# Patient Record
Sex: FEMALE | Race: WHITE
[De-identification: names, ages, dates, MRNs, and addresses within clinical notes are randomized per-mention and may not be internally consistent; named-entity substitution may affect disease eponyms.]

---

## 2019-07-06 ENCOUNTER — HOSPITAL ENCOUNTER (EMERGENCY)
Dept: HOSPITAL 77 - KA.ED | Age: 34
Discharge: HOME | End: 2019-07-06
Payer: COMMERCIAL

## 2019-07-06 VITALS — HEART RATE: 60 BPM | SYSTOLIC BLOOD PRESSURE: 148 MMHG | DIASTOLIC BLOOD PRESSURE: 82 MMHG

## 2019-07-06 DIAGNOSIS — S39.011A: Primary | ICD-10-CM

## 2019-07-06 DIAGNOSIS — R79.89: ICD-10-CM

## 2019-07-06 DIAGNOSIS — Z79.899: ICD-10-CM
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
## 2019-07-06 LAB
ANION GAP SERPL CALC-SCNC: 11 MMOL/L (ref 5–15)
CHLORIDE SERPL-SCNC: 111 MMOL/L (ref 98–115)
SODIUM SERPL-SCNC: 144 MMOL/L (ref 136–145)

## 2019-07-06 PROCEDURE — 96375 TX/PRO/DX INJ NEW DRUG ADDON: CPT

## 2019-07-06 PROCEDURE — 96374 THER/PROPH/DIAG INJ IV PUSH: CPT

## 2019-07-06 PROCEDURE — 80053 COMPREHEN METABOLIC PANEL: CPT

## 2019-07-06 PROCEDURE — 72100 X-RAY EXAM L-S SPINE 2/3 VWS: CPT

## 2019-07-06 PROCEDURE — 85025 COMPLETE CBC W/AUTO DIFF WBC: CPT

## 2019-07-06 PROCEDURE — 99284 EMERGENCY DEPT VISIT MOD MDM: CPT

## 2019-07-06 PROCEDURE — 73502 X-RAY EXAM HIP UNI 2-3 VIEWS: CPT

## 2019-07-06 PROCEDURE — 74177 CT ABD & PELVIS W/CONTRAST: CPT

## 2019-07-06 PROCEDURE — 81001 URINALYSIS AUTO W/SCOPE: CPT

## 2019-07-06 PROCEDURE — 84703 CHORIONIC GONADOTROPIN ASSAY: CPT

## 2019-07-06 PROCEDURE — 36415 COLL VENOUS BLD VENIPUNCTURE: CPT

## 2019-07-06 RX ADMIN — Medication PRN ML: at 15:00

## 2019-07-06 RX ADMIN — Medication PRN ML: at 15:10

## 2019-07-06 NOTE — CR
______________________________________________________________________________   

  

[id number] RAD/RAD Pelvis 1V W 2V Left Hip  

Exam:   

   

 RAD Pelvis 1V W 2V Left Hip  

   

 Clinical Data:   

   

 TRAUMA  

   

 COMPARISON:   

   

 NO PREVIOUS SIMILAR EXAM IS AVAILABLE  

   

 FINDINGS:   

   

 No fracture or dislocation is seen.   

   

 IMPRESSION:  

   

 NEGATIVE EXAM  

   

 Electronically signed by Naif Shine MD on 7/6/2019 3:41 PM  

   

  

Naif Shine MD                 

 07/06/19 2267    

  

Thank you for allowing us to participate in the care of your patient.

## 2019-07-06 NOTE — EDM.PDOC
ED HPI GENERAL MEDICAL PROBLEM





- General


Chief Complaint: Lower Extremity Injury/Pain


Stated Complaint: LEFT HIP INJURY


Time Seen by Provider: 07/06/19 14:45


Source of Information: Reports: Patient


History Limitations: Reports: No Limitations





- History of Present Illness


INITIAL COMMENTS - FREE TEXT/NARRATIVE: 





34 YO WF presents to ER complaining of left hip pain [place] 

[place]. Pt reports she let go of the tube and [place] 

[place] causing immediate pain and the sensation of 

swelling to the left hip and LLQ of her abdomen. Pt denies any other injuries. 

Pt denies shortness of breath, no chest pain, no head or neck pain. Pt able to 

ambulate into ER with difficulty. Pt complains of her left hip but has full 

active and passive ROM of hip without pain. Pt is tender on abdominal wall with 

pain on truck rotation or hip flexion on left side.  


Onset: Today


Duration: Hour(s): (1)


Location: Reports: Lower Extremity, Left


Quality: Reports: Ache


Severity: Severe


Improves with: Reports: Rest


Worsens with: Reports: Movement


Context: Reports: Activity


Associated Symptoms: Reports: No Other Symptoms.  Denies: Chest Pain, Headaches

, Nausea/Vomiting, Shortness of Breath


  ** Left Hip


Pain Score (Numeric/FACES): 8





- Related Data


 Allergies











Allergy/AdvReac Type Severity Reaction Status Date / Time


 


No Known Allergies Allergy   Verified 07/06/19 14:38











Home Meds: 


 Home Meds





Levothyroxine 75 mcg PO DAILY 07/06/19 [History]


Meloxicam 15 mg PO DAILY 07/06/19 [History]











Review of Systems





- Review of Systems


Review Of Systems: See Below


Constitutional: Reports: No Symptoms


Eyes: Reports: No Symptoms


Ears: Reports: No Symptoms


Nose: Reports: No Symptoms


Mouth/Throat: Reports: No Symptoms


Respiratory: Reports: No Symptoms


Cardiovascular: Reports: No Symptoms


GI/Abdominal: Reports: No Symptoms


Genitourinary: Reports: No Symptoms


Musculoskeletal: Reports: Leg Pain


Skin: Reports: No Symptoms


Neurological: Reports: No Symptoms


Psychiatric: Reports: No Symptoms





ED EXAM, GENERAL





- Physical Exam


Exam: See Below


Exam Limited By: No Limitations


General Appearance: Alert, WD/WN, Mild Distress


Eye Exam: Bilateral Eye: PERRL


Throat/Mouth: Normal Inspection, Normal Lips, Normal Teeth, Normal Gums, Normal 

Oropharynx, Normal Voice, No Airway Compromise


Head: Atraumatic, Normocephalic


Neck: Normal Inspection, Supple, Non-Tender, Full Range of Motion


Respiratory/Chest: No Respiratory Distress, Lungs Clear, Normal Breath Sounds, 

No Accessory Muscle Use, Chest Non-Tender


Cardiovascular: Normal Peripheral Pulses, Regular Rate, Rhythm, No Edema, No 

Gallop, No JVD, No Murmur, No Rub


GI/Abdominal: Normal Bowel Sounds, Soft, No Organomegaly, No Distention, No 

Abnormal Bruit, No Mass, Guarding, Rebound, Tender (LLQ tenderness)


Back Exam: Normal Inspection, Full Range of Motion, NT


Extremities: Normal Inspection, Normal Range of Motion, Non-Tender, Normal 

Capillary Refill, No Pedal Edema


Neurological: Alert, Oriented, CN II-XII Intact, Normal Cognition, Normal Gait, 

Normal Reflexes, No Motor/Sensory Deficits


Psychiatric: Normal Affect, Normal Mood, Anxious


Skin Exam: Warm, Dry, Intact, Normal Color, No Rash


Lymphatic: No Adenopathy





Course





- Vital Signs


Last Recorded V/S: 


 Last Vital Signs











Temp  36.9 C   07/06/19 14:41


 


Pulse  60   07/06/19 14:41


 


Resp  14   07/06/19 14:41


 


BP  148/82 H  07/06/19 14:41


 


Pulse Ox  97   07/06/19 14:41














- Orders/Labs/Meds


Orders: 


 Active Orders 24 hr











 Category Date Time Status


 


 Peripheral IV Care [RC] .AS DIRECTED Care  07/06/19 15:00 Active


 


 Acetaminophen/HYDROcodone [Norco 325-10 MG] Med  07/06/19 16:30 Once





 10 tab PO ONETIME ONE   


 


 Sodium Chloride 0.9% [Normal Saline] 50 ml Med  07/06/19 15:45 Active





 IV ASDIRECTED   


 


 Sodium Chloride 0.9% [Saline Flush] Med  07/06/19 15:00 Active





 10 ml FLUSH Q8HR PRN   


 


 Peripheral IV Insertion Adult [OM.PC] Routine Oth  07/06/19 15:00 Ordered








 Medication Orders





Hydrocodone Bitart/Acetaminophen (Norco 325-10 Mg)  10 tab PO ONETIME ONE


   Stop: 07/06/19 16:31


Sodium Chloride (Normal Saline)  50 mls @ 200 mls/hr IV ASDIRECTED DANIELLE


   Last Admin: 07/06/19 16:04  Dose: 200 mls/hr


Sodium Chloride (Saline Flush)  10 ml FLUSH Q8HR PRN


   PRN Reason: keep vein open


   Last Admin: 07/06/19 15:10  Dose: 10 ml


   Admin: 07/06/19 15:00  Dose: 10 ml








Labs: 


 Laboratory Tests











  07/06/19 07/06/19 07/06/19 Range/Units





  15:25 15:47 15:47 


 


WBC   9.81   (5.00-10.00)  10^3/uL


 


RBC   3.85   (3.80-5.50)  10^6/uL


 


Hgb   12.0   (12.0-16.0)  g/dL


 


Hct   35.6 L   (37.0-47.0)  %


 


MCV   92.5 H   (82.0-92.0)  fL


 


MCH   31.2 H   (27.0-31.0)  pg


 


MCHC   33.7   (32.0-36.0)  g/dL


 


RDW   12.7   (11.5-14.5)  %


 


Plt Count   204   (150-400)  10^3/uL


 


MPV   11.2 H   (7.4-10.4)  fL


 


Immature Gran % (Auto)   0.2   (0.0-5.0)  %


 


Neut % (Auto)   67.5   (50.0-70.0)  %


 


Lymph % (Auto)   17.2 L   (20.0-40.0)  %


 


Mono % (Auto)   5.9   (2.0-8.0)  %


 


Eos % (Auto)   8.8 H   (1.0-3.0)  %


 


Baso % (Auto)   0.4   (0.0-1.0)  %


 


Immature Gran # (Auto)   0.02   (0.00-0.50)  10^3/uL


 


Neut # (Auto)   6.62   (2.50-7.00)  10^3/uL


 


Lymph # (Auto)   1.69   (1.00-4.00)  10^3/uL


 


Mono # (Auto)   0.58   (0.10-0.80)  10^3/uL


 


Eos # (Auto)   0.86 H   (0.10-0.30)  10^3/uL


 


Baso # (Auto)   0.04   (0.00-0.10)  10^3/uL


 


Sodium    144  (136-145)  mmol/L


 


Potassium    3.9  (3.3-5.3)  mmol/L


 


Chloride    111  ()  mmol/L


 


Carbon Dioxide    25.9  (21.0-32.0)  mmol/L


 


Anion Gap    11.0  (5-15)  mmol/L


 


BUN    10  (6-25)  mg/dL


 


Creatinine    0.68  (0.51-1.17)  mg/dL


 


Est Cr Clr Drug Dosing    92.20  mL/min


 


Estimated GFR (MDRD)    > 60  mL/min


 


Glucose    97 (75 - 99) mg/dL


 


Calcium    8.6 L  (8.7-10.3)  mg/dL


 


Total Bilirubin    0.2  (0.2-1.0)  mg/dL


 


AST    74 H  (15-37)  U/L


 


ALT    86 H  (12-78)  U/L


 


Alkaline Phosphatase    74  ()  IU/L


 


Total Protein    6.2 L  (6.4-8.2)  g/dL


 


Albumin    3.51  (3.00-4.80)  g/dL


 


HCG, Qual    Negative  (NEGATIVE)  


 


Specimen Type  Urinvoid    


 


Urine Color  Light yellow    (YELLOW)  


 


Urine Appearance  Clear    (CLEAR)  


 


Urine pH  6.5    (5.0-9.0)  


 


Ur Specific Gravity  <= 1.005    (1.005-1.030)  


 


Urine Protein  Negative    (NEGATIVE)  mg/dL


 


Urine Glucose (UA)  Negative    (NEGATIVE)  mg/dL


 


Urine Ketones  Negative    (NEGATIVE)  mg/dL


 


Urine Occult Blood  Negative    (NEGATIVE)  


 


Urine Nitrite  Negative    (NEGATIVE)  


 


Urine Bilirubin  Negative    (NEGATIVE)  


 


Urine Urobilinogen  0.2    (0.2-1.0)  E.U./dL


 


Ur Leukocyte Esterase  Negative    (NEGATIVE)  


 


Urine RBC  0-5    (0-5)  /HPF


 


Urine WBC  0-5    (0-5)  /HPF


 


Ur Epithelial Cells  Moderate H    /LPF


 


Urine Bacteria  Not seen    (NONE TO FEW)  /HPF











Meds: 


Medications











Generic Name Dose Route Start Last Admin





  Trade Name Freq  PRN Reason Stop Dose Admin


 


Hydrocodone Bitart/Acetaminophen  10 tab  07/06/19 16:30  





  Norco 325-10 Mg  PO  07/06/19 16:31  





  ONETIME ONE   





     





     





     





     


 


Sodium Chloride  50 mls @ 200 mls/hr  07/06/19 15:45  07/06/19 16:04





  Normal Saline  IV   200 mls/hr





  ASDIRECTED DANIELLE   Administration





     





     





     





     


 


Sodium Chloride  10 ml  07/06/19 15:00  07/06/19 15:10





  Saline Flush  FLUSH   10 ml





  Q8HR PRN   Administration





  keep vein open   





     





     





     














Discontinued Medications














Generic Name Dose Route Start Last Admin





  Trade Name Felixq  PRN Reason Stop Dose Admin


 


Hydromorphone HCl  1 mg  07/06/19 15:04  07/06/19 15:08





  Dilaudid  IVPUSH  07/06/19 15:05  1 mg





  ONETIME ONE   Administration





     





     





     





     


 


Iopamidol  100 ml  07/06/19 15:35  07/06/19 16:04





  Isovue-370 (76%)  IV  07/06/19 15:36  75 ml





  ONETIME ONE   Administration





     





     





     





     


 


Ondansetron HCl  4 mg  07/06/19 15:04  07/06/19 15:05





  Zofran  IVPUSH  07/06/19 15:05  4 mg





  ONETIME ONE   Administration





     





     





     





     














- Radiology Interpretation


Free Text/Narrative:: 





L-spine- NAD; no fx


Pelvis- NAD; no fx


L hip- NAD; no fx


CT abd/pelvis- NAD





Departure





- Departure


Time of Disposition: 16:35


Disposition: Home, Self-Care 01


Condition: Good


Clinical Impression: 


Strain of abdominal wall


Qualifiers:


 Encounter type: initial encounter Qualified Code(s): S39.011A - Strain of 

muscle, fascia and tendon of abdomen, initial encounter








- Discharge Information


Instructions:  Pain Medicine Instructions, Easy-to-Read, Muscle Strain, Easy-to-

Read


Referrals: 


Claudia Jaeger, NP [Nurse Practitioner] - 


Forms:  ED Department Discharge


Additional Instructions: 


1. discharge home


2. hydrocodone 10/325 1 tablet Q6 PRN


3. continue melxican


4. follow up in clinic early next week for recheck


5. return to ER fdor worsening symptoms


6. ice to hip- area may bruise 





- My Orders


Last 24 Hours: 


My Active Orders





07/06/19 15:00


Peripheral IV Care [RC] .AS DIRECTED 


Sodium Chloride 0.9% [Saline Flush]   10 ml FLUSH Q8HR PRN 


Peripheral IV Insertion Adult [OM.PC] Routine 





07/06/19 15:45


Sodium Chloride 0.9% [Normal Saline] 50 ml IV ASDIRECTED 





07/06/19 16:30


Acetaminophen/HYDROcodone [Norco 325-10 MG]   10 tab PO ONETIME ONE 














- Assessment/Plan


Last 24 Hours: 


My Active Orders





07/06/19 15:00


Peripheral IV Care [RC] .AS DIRECTED 


Sodium Chloride 0.9% [Saline Flush]   10 ml FLUSH Q8HR PRN 


Peripheral IV Insertion Adult [OM.PC] Routine 





07/06/19 15:45


Sodium Chloride 0.9% [Normal Saline] 50 ml IV ASDIRECTED 





07/06/19 16:30


Acetaminophen/HYDROcodone [Norco 325-10 MG]   10 tab PO ONETIME ONE 











Assessment:: 





1. abdominal wall strain


2. elevated LFt's - pt aware and this has been addressed as outpatient in the 

recent past





Plan: 





1. discharge home


2. hydrocodone 10/325 1 tablet Q6 PRN


3. continue melxican


4. follow up in clinic early next week for recheck


5. return to ER fdor worsening symptoms


6. ice to hip- area may bruise

## 2019-07-06 NOTE — CR
______________________________________________________________________________   

  

[id number] RAD/RAD Lumbar Spine 2-3V  

EXAM:  

   

 AP AND LATERAL LUMBAR SPINE.  

   

 INDICATION:  

   

 Trauma  

   

 COMPARISON:  

   

 No previous similar exam is available for comparison.  

   

 FINDINGS:  

   

 No fracture or subluxation is seen.  

   

 There is preservation of height of disc spaces and vertebrae.  

   

 The pedicles are intact.  

   

 IMPRESSION:  

   

 No fracture or subluxation.  

   

   

   

 Electronically signed by Naif Shine MD on 7/6/2019 3:40 PM  

   

  

Naif Shine MD                 

 07/06/19 1109    

  

Thank you for allowing us to participate in the care of your patient.

## 2019-07-06 NOTE — CT
______________________________________________________________________________   

  

[id number] CT/CT Abdomen Pelvis W IV  

EXAM:   

   

 CT Abdomen Pelvis W IV  

   

 CLINICAL DATA:   

   

 TRAUMA  

   

 COMPARISON:   

   

 NO PREVIOUS SIMILAR EXAM IS AVAILABLE.  

   

 FINDINGS:   

   

 The liver and spleen are unremarkable.  

   

 The kidneys and adrenals show no abnormality.  

   

 The aorta and pancreas are within normal limits.  

   

 There is no bowel distention.  

   

 There is no bowel wall thickening either.  

   

 Minimal free fluid in pelvis is likely physiologic.  

   

 The gallbladder and appendix are unremarkable.  

   

 There is no adenopathy.  

   

 The pelvis shows no mass, free fluid, abscess, inflammatory change, or  

 adenopathy.  

   

 Reformatted views of the lumbar spine and sacrum show no fracture.  

   

 The bony pelvis shows no fracture.  

   

 IMPRESSION:  

   

 NO ACUTE PROCESS.  

   

 Electronically signed by Naif Shine MD on 7/6/2019 4:14 PM  

   

  

Naif Shine MD                 

 07/06/19 [id number]    

  

Thank you for allowing us to participate in the care of your patient.

## 2020-08-21 NOTE — EDM.PDOC
ED HPI GENERAL MEDICAL PROBLEM





- General


Chief Complaint: Headache


Stated Complaint: HEADACHE/CHRONIC ABDOMINAL PAIN


Time Seen by Provider: 08/21/20 10:50


Source of Information: Reports: Patient


History Limitations: Reports: No Limitations





- History of Present Illness


INITIAL COMMENTS - FREE TEXT/NARRATIVE: 





35 YO WF PRESENTS TO ER COMPLAINING OF HEADACHE WHICH BEGAN 2 WEEKS AGO. PT 

REPORTS PAIN HAS BEEN LOCATED TO THE RIGHT TEMPORAL REGION AND STATES IT HAS 

BEEN TENDER TO TOUCH. 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

OFF HER HUMARA DUE TO ELEVATED LIVER ENZYMES. PT IS SUPPOSE TO START NEW 

MEDICATION BUT IS AWAITING LIVER BIOPSY. PT REPORTS SOME MID RUQ ABDOMINAL PAIN 

WITH ASSOCIATED NAUSEA. PT DENIES FEVER/CHILLS, NO COUGH/CONGESTION, NO 

VOMITING/DIARRHEA. PT REPORTS NO VISUAL CHANGES, NO PAIN TO EYE, NO DIZZINESS OR

LIGHTHEADED NOTED.     


Duration: Day(s): (10)


Location: Reports: Head


Quality: Reports: Ache


Severity: Mild


Improves with: Reports: None


Worsens with: Reports: None


Associated Symptoms: Reports: No Other Symptoms, Headaches, Nausea/Vomiting.  

Denies: Confusion, Chest Pain, Cough, cough w sputum, Fever/Chills, Rash, 

Shortness of Breath, Syncope, Weakness


  ** Right Lower Abdomen


Pain Score (Numeric/FACES): 4





  ** HEADACHE RIGHT TEMPORAL


Pain Score (Numeric/FACES): 7





- Related Data


                                    Allergies











Allergy/AdvReac Type Severity Reaction Status Date / Time


 


No Known Allergies Allergy   Verified 08/21/20 10:38











Home Meds: 


                                    Home Meds





Levothyroxine 75 mcg PO DAILY 07/06/19 [History]


predniSONE [Prednisone] 20 mg PO DAILY #15 tablet 08/21/20 [Rx]











Past Medical History


Musculoskeletal History: Reports: RA





- Past Surgical History


Head Surgeries/Procedures: Reports: None





Social & Family History





- Tobacco Use


Smoking Status *Q: Never Smoker





- Caffeine Use


Caffeine Use: Reports: Coffee





- Recreational Drug Use


Recreational Drug Use: No





ED ROS GENERAL





- Review of Systems


Review Of Systems: See Below


Constitutional: Reports: No Symptoms


HEENT: Reports: No Symptoms.  Denies: Vision Change


Respiratory: Reports: No Symptoms


Cardiovascular: Reports: No Symptoms


Endocrine: Reports: No Symptoms


GI/Abdominal: Reports: Abdominal Pain, Nausea


: Reports: No Symptoms


Musculoskeletal: Reports: No Symptoms


Skin: Reports: No Symptoms


Neurological: Reports: Headache.  Denies: Confusion, Dizziness, Numbness, 

Paresthesia, Pre-Existing Deficit, Syncope, Trouble Speaking, Difficulty 

Walking, Weakness, Change in Speech, Gait Disturbance


Psychiatric: Reports: No Symptoms


Hematologic/Lymphatic: Reports: No Symptoms


Immunologic: Reports: No Symptoms





- Physical Exam


Exam: See Below


Exam Limited By: No Limitations


General Appearance: Alert, WD/WN, No Apparent Distress


Eye Exam: Bilateral Eye: EOMI, PERRL, Vision Changes


Ears: Normal External Exam, Normal Canal, Hearing Grossly Normal, Normal TMs


Nose: Normal Inspection, Normal Mucosa, No Blood


Throat/Mouth: Normal Inspection, Normal Lips, Normal Teeth, Normal Gums, Normal 

Oropharynx, Normal Voice, No Airway Compromise


Head Exam: Atraumatic, Normocephalic


Neck: Normal Inspection, Supple, Non-Tender, Full Range of Motion


Respiratory/Chest: No Respiratory Distress, Lungs Clear, Normal Breath Sounds, 

No Accessory Muscle Use, Chest Non-Tender


Cardiovascular: Normal Peripheral Pulses, Regular Rate, Rhythm, No Edema, No 

Gallop, No JVD, No Murmur, No Rub


GI/Abdominal: Normal Bowel Sounds, Soft, Non-Tender, No Organomegaly, No 

Distention, No Abnormal Bruit, No Mass


Neuro Exam (Abbreviated): Alert, Oriented, CN II-XII Intact, Normal Cognition, 

Normal Gait, Normal Reflexes, No Motor/Sensory Deficits


Back Exam: Normal Inspection, Full Range of Motion, NT


Extremities: Normal Inspection, Normal Range of Motion, Non-Tender, No Pedal 

Edema, Normal Capillary Refill


Psychiatric: Normal Affect, Normal Mood


Skin Exam: Warm, Dry, Intact, Normal Color, No Rash





Course





- Vital Signs


Last Recorded V/S: 


                                Last Vital Signs











Temp  36.6 C   08/21/20 11:49


 


Pulse  53 L  08/21/20 11:49


 


Resp  14   08/21/20 11:49


 


BP  118/64   08/21/20 11:49


 


Pulse Ox  98   08/21/20 11:49














- Orders/Labs/Meds


Orders: 


                               Active Orders 24 hr











 Category Date Time Status


 


 Visual Acuity [Vision Test] [RC] ASDIRECTED Care  08/21/20 11:26 Active











Labs: 


                                Laboratory Tests











  08/21/20 08/21/20 08/21/20 Range/Units





  10:45 10:45 10:45 


 


WBC  5.80    (5.00-10.00)  10^3/uL


 


RBC  4.17    (3.80-5.50)  10^6/uL


 


Hgb  12.7    (12.0-16.0)  g/dL


 


Hct  38.2    (37.0-47.0)  %


 


MCV  91.6    (82.0-92.0)  fL


 


MCH  30.5    (27.0-31.0)  pg


 


MCHC  33.2    (32.0-36.0)  g/dL


 


RDW  12.1    (11.5-14.5)  %


 


Plt Count  256    (150-400)  10^3/uL


 


MPV  11.0 H    (7.4-10.4)  fL


 


Immature Gran % (Auto)  0.0    (0.0-5.0)  %


 


Neut % (Auto)  56.0    (50.0-70.0)  %


 


Lymph % (Auto)  29.5    (20.0-40.0)  %


 


Mono % (Auto)  5.9    (2.0-8.0)  %


 


Eos % (Auto)  7.9 H    (1.0-3.0)  %


 


Baso % (Auto)  0.7    (0.0-1.0)  %


 


Neut # (Auto)  3.25    (2.50-7.00)  10^3/uL


 


Lymph # (Auto)  1.71    (1.00-4.00)  10^3/uL


 


Mono # (Auto)  0.34    (0.10-0.80)  10^3/uL


 


Eos # (Auto)  0.46 H    (0.10-0.30)  10^3/uL


 


Baso # (Auto)  0.04    (0.00-0.10)  10^3/uL


 


Immature Gran # (Auto)  0.00    (0.00-0.50)  10^3/uL


 


ESR     (0-20)  mm/hr


 


Sodium   142   (136-145)  mmol/L


 


Potassium   4.7   (3.3-5.3)  mmol/L


 


Chloride   104   ()  mmol/L


 


Carbon Dioxide   28.2   (21.0-32.0)  mmol/L


 


Anion Gap   14.5   (5-15)  mmol/L


 


BUN   11   (6-25)  mg/dL


 


Creatinine   0.71   (0.51-1.17)  mg/dL


 


Est Cr Clr Drug Dosing   87.47   mL/min


 


Estimated GFR (MDRD)   > 60   mL/min


 


Glucose   82  (75 - 99) mg/dL


 


Calcium   8.8   (8.7-10.3)  mg/dL


 


Total Bilirubin   0.6   (0.2-1.0)  mg/dL


 


AST   143 H   (15-37)  U/L


 


ALT   261 H   (12-78)  U/L


 


Alkaline Phosphatase   406 H   ()  IU/L


 


Total Protein   7.7   (6.4-8.2)  g/dL


 


Albumin   3.94   (3.00-4.80)  g/dL


 


Lipase   98   ()  U/L


 


HCG, Qual   Negative   (NEGATIVE)  


 


Specimen Type    Urincc  


 


Urine Color    Yellow  (YELLOW)  


 


Urine Appearance    Clear  (CLEAR)  


 


Urine pH    6.5  (5.0-9.0)  


 


Ur Specific Gravity    1.010  (1.005-1.030)  


 


Urine Protein    Negative  (NEGATIVE)  mg/dL


 


Urine Glucose (UA)    Negative  (NEGATIVE)  mg/dL


 


Urine Ketones    Negative  (NEGATIVE)  mg/dL


 


Urine Occult Blood    Negative  (NEGATIVE)  


 


Urine Nitrite    Negative  (NEGATIVE)  


 


Urine Bilirubin    Negative  (NEGATIVE)  


 


Urine Urobilinogen    0.2  (0.2-1.0)  E.U./dL


 


Ur Leukocyte Esterase    Negative  (NEGATIVE)  














  08/21/20 Range/Units





  10:45 


 


WBC   (5.00-10.00)  10^3/uL


 


RBC   (3.80-5.50)  10^6/uL


 


Hgb   (12.0-16.0)  g/dL


 


Hct   (37.0-47.0)  %


 


MCV   (82.0-92.0)  fL


 


MCH   (27.0-31.0)  pg


 


MCHC   (32.0-36.0)  g/dL


 


RDW   (11.5-14.5)  %


 


Plt Count   (150-400)  10^3/uL


 


MPV   (7.4-10.4)  fL


 


Immature Gran % (Auto)   (0.0-5.0)  %


 


Neut % (Auto)   (50.0-70.0)  %


 


Lymph % (Auto)   (20.0-40.0)  %


 


Mono % (Auto)   (2.0-8.0)  %


 


Eos % (Auto)   (1.0-3.0)  %


 


Baso % (Auto)   (0.0-1.0)  %


 


Neut # (Auto)   (2.50-7.00)  10^3/uL


 


Lymph # (Auto)   (1.00-4.00)  10^3/uL


 


Mono # (Auto)   (0.10-0.80)  10^3/uL


 


Eos # (Auto)   (0.10-0.30)  10^3/uL


 


Baso # (Auto)   (0.00-0.10)  10^3/uL


 


Immature Gran # (Auto)   (0.00-0.50)  10^3/uL


 


ESR  16  (0-20)  mm/hr


 


Sodium   (136-145)  mmol/L


 


Potassium   (3.3-5.3)  mmol/L


 


Chloride   ()  mmol/L


 


Carbon Dioxide   (21.0-32.0)  mmol/L


 


Anion Gap   (5-15)  mmol/L


 


BUN   (6-25)  mg/dL


 


Creatinine   (0.51-1.17)  mg/dL


 


Est Cr Clr Drug Dosing   mL/min


 


Estimated GFR (MDRD)   mL/min


 


Glucose  (75 - 99) mg/dL


 


Calcium   (8.7-10.3)  mg/dL


 


Total Bilirubin   (0.2-1.0)  mg/dL


 


AST   (15-37)  U/L


 


ALT   (12-78)  U/L


 


Alkaline Phosphatase   ()  IU/L


 


Total Protein   (6.4-8.2)  g/dL


 


Albumin   (3.00-4.80)  g/dL


 


Lipase   ()  U/L


 


HCG, Qual   (NEGATIVE)  


 


Specimen Type   


 


Urine Color   (YELLOW)  


 


Urine Appearance   (CLEAR)  


 


Urine pH   (5.0-9.0)  


 


Ur Specific Gravity   (1.005-1.030)  


 


Urine Protein   (NEGATIVE)  mg/dL


 


Urine Glucose (UA)   (NEGATIVE)  mg/dL


 


Urine Ketones   (NEGATIVE)  mg/dL


 


Urine Occult Blood   (NEGATIVE)  


 


Urine Nitrite   (NEGATIVE)  


 


Urine Bilirubin   (NEGATIVE)  


 


Urine Urobilinogen   (0.2-1.0)  E.U./dL


 


Ur Leukocyte Esterase   (NEGATIVE)  











Meds: 


Medications














Discontinued Medications














Generic Name Dose Route Start Last Admin





  Trade Name Freq  PRN Reason Stop Dose Admin


 


Sodium Chloride  1,000 mls @ 999 mls/hr  08/21/20 10:39  08/21/20 10:53





  Normal Saline  IV  08/21/20 11:39  999 mls/hr





  .BOLUS ONE   Administration


 


Ketorolac Tromethamine  30 mg  08/21/20 11:08  08/21/20 11:34





  Toradol  IVPUSH  08/21/20 11:09  30 mg





  ONETIME ONE   Administration


 


Ondansetron HCl  4 mg  08/21/20 11:08  08/21/20 11:20





  Zofran  IVPUSH  08/21/20 11:09  4 mg





  ONETIME ONE   Administration














- Radiology Interpretation


Free Text/Narrative:: 





CT Head- NAD





- Re-Assessments/Exams


Free Text/Narrative Re-Assessment/Exam: 





08/21/20 12:13


DISCUSSED NEED FOR FOLLOW UP FOR FURTHER EVALUATION AND POSSIBLE TEMPORAL ARTERY

 BIOPSY IF SYMPTOMS CONTINUE. INSTRUCTED PATIENT TO FOLLOW UP WITH NEUROLOGY- 

[facility] FOR FURTHER EVALUATION AND TREATMENT. PT DENIES VISUAL CHANGES, 

SEVERE HEADACHE, FEVER/CHILLS OR DIZZINESS. PT REPORTS PAIN IMPROVED AFTER 

TORADOL. WILL START PATIENT ON PREDNISONE 60MG DAILY X 5 DAYS. 





Departure





- Departure


Time of Disposition: 12:23


Disposition: Home, Self-Care 01


Condition: Good


Clinical Impression: 


Headache


Qualifiers:


 Headache chronicity pattern: acute headache Intractability: not intractable 








- Discharge Information


Prescriptions: 


predniSONE [Prednisone] 20 mg PO DAILY #15 tablet


Instructions:  General Headache Without Cause


Referrals: 


Dorothy Hooker NP [Primary Care Provider] - 


Forms:  ED Department Discharge


Additional Instructions: 


1. DISCHARGE HOME


2. PREDNISONE 60MG DAILY X 5 DAYS


3. FOLLOW UP WITH [facility]  [phone] FOR FURTHER EVALUATION 

AND TREATMENT


4. RETURN TO ER FOR WORSENING SYMPTOMS


5. OFF WORK TODAY





Sepsis Event Note (ED)





- Evaluation


Sepsis Screening Result: No Definite Risk





- Focused Exam


Vital Signs: 


                                   Vital Signs











  Temp Pulse Resp BP Pulse Ox


 


 08/21/20 11:49  36.6 C  53 L  14  118/64  98


 


 08/21/20 10:27  36.8 C  68  16  125/84  99














- My Orders


Last 24 Hours: 


My Active Orders





08/21/20 11:26


Visual Acuity [Vision Test] [RC] ASDIRECTED 














- Assessment/Plan


Last 24 Hours: 


My Active Orders





08/21/20 11:26


Visual Acuity [Vision Test] [RC] ASDIRECTED 











Assessment:: 





1. RIGHT SIDED TEMPORAL HEADACHE


2. ELEVATED LIVER ENZYMES- BIOPSY NEXT WEEK AS OUTPATIENT








Plan: 





1. DISCHARGE HOME


2. PREDNISONE 60MG DAILY X 5 DAYS


3. FOLLOW UP WITH [facility]  [phone] FOR FURTHER EVALUATION 

AND TREATMENT


4. RETURN TO ER FOR WORSENING SYMPTOMS


5. OFF WORK TODAY

## 2020-08-21 NOTE — CT
______________________________________________________________________________   

  

[id number] CT/CT Head WO IV  

EXAM: NONCONTRAST HEAD CT  

   

 INDICATION: PAIN.  

   

 COMPARISON: None.  

   

 DISCUSSION:   

 The ventricles and sulci are normal in size and configuration.  

   

 The gray and white matter are normal in attenuation.  

   

 No mass effect or midline shift.  

   

 No acute hemorrhage or extra-axial fluid collection.  

   

 No acute territorial infarct is identified.  

   

 Mild to moderate paranasal sinus mucosal thickening.  

   

 IMPRESSION:    

 1.  Moderate paranasal sinus mucosal thickening.  

 2.  No acute intracranial findings.  

   

 Electronically signed by Andrade Jesus MD on 8/21/2020 11:55 AM  

   

  

Andrade Jesus MD                 

 08/21/20 [id number]    

  

Thank you for allowing us to participate in the care of your patient.